# Patient Record
Sex: MALE | Race: WHITE | NOT HISPANIC OR LATINO | Employment: FULL TIME | RURAL
[De-identification: names, ages, dates, MRNs, and addresses within clinical notes are randomized per-mention and may not be internally consistent; named-entity substitution may affect disease eponyms.]

---

## 2022-11-30 ENCOUNTER — OFFICE VISIT (OUTPATIENT)
Dept: FAMILY MEDICINE CLINIC | Facility: CLINIC | Age: 32
End: 2022-11-30

## 2022-11-30 VITALS
HEIGHT: 74 IN | OXYGEN SATURATION: 97 % | WEIGHT: 182 LBS | BODY MASS INDEX: 23.36 KG/M2 | DIASTOLIC BLOOD PRESSURE: 82 MMHG | HEART RATE: 82 BPM | SYSTOLIC BLOOD PRESSURE: 124 MMHG

## 2022-11-30 DIAGNOSIS — S68.620D PARTIAL TRAUMATIC AMPUTATION OF RIGHT INDEX FINGER THROUGH PHALANX, SUBSEQUENT ENCOUNTER: Primary | ICD-10-CM

## 2022-11-30 PROCEDURE — 99203 OFFICE O/P NEW LOW 30 MIN: CPT | Performed by: INTERNAL MEDICINE

## 2022-11-30 RX ORDER — CEPHALEXIN 500 MG/1
CAPSULE ORAL
COMMUNITY
Start: 2022-11-20

## 2022-12-01 NOTE — PROGRESS NOTES
"    Office Note     Name: Juan Long    : 1990     MRN: [id number]     Chief Complaint  Suture / Staple Removal (Pt is here to have sutures removed from right index finger. )    Subjective     History of Present Illness:  Juan Long is a 32 y.o. male who presents today for suture removal.  10 days ago he was seen in the emergency room at  for a finger injury.  He was told he had 12 sutures placed that would need to be removed today.  He also has an artificial nail placed to protect the nailbed.  He is currently on antibiotics as well.  He was advised to follow-up with Ortho but this was not a    Review of Systems:   Review of Systems    Past Medical History:   Past Medical History:   Diagnosis Date   • Heart murmur        Past Surgical History: History reviewed. No pertinent surgical history.    Family History:   Family History   Problem Relation Age of Onset   • Diabetes Maternal Aunt    • Diabetes Maternal Uncle        Social History:   Social History     Socioeconomic History   • Marital status:    Tobacco Use   • Smoking status: Every Day     Packs/day: 0.50     Types: Cigarettes     Start date:    • Smokeless tobacco: Current     Types: Chew   Vaping Use   • Vaping Use: Never used   Substance and Sexual Activity   • Alcohol use: Yes     Comment: rarely   • Drug use: Defer   • Sexual activity: Defer       Immunizations:   Immunization History   Administered Date(s) Administered   • Hep B, Adolescent or Pediatric 2002, 2002, 2002, 10/03/2002   • Tdap 2022        Medications:     Current Outpatient Medications:   •  cephalexin (KEFLEX) 500 MG capsule, , Disp: , Rfl:     Allergies:   Allergies   Allergen Reactions   • Penicillins Unknown - Low Severity   • Ceclor [Cefaclor] Unknown - High Severity       Objective     Vital Signs  /82   Pulse 82   Ht 188 cm (74\")   Wt 82.6 kg (182 lb)   SpO2 97%   BMI 23.37 kg/m²   Estimated body mass index is 23.37 kg/m² " "as calculated from the following:    Height as of this encounter: 188 cm (74\").    Weight as of this encounter: 82.6 kg (182 lb).    BMI is within normal parameters. No other follow-up for BMI required.      Physical Exam  Vitals and nursing note reviewed.   Musculoskeletal:      Comments: Index finger with nail bed cover in place, 9 suture visible, skin well approximated and healing with minimal erythema          Procedures     Assessment and Plan     1. Partial traumatic amputation of right index finger through phalanx, subsequent encounter  Upon review of the patient's records it appears he had a minimal/partial amputation of the distal phalanx of the index finger.  Patient states he was told he had 12 sutures to remove however the records show that he had 4 deep Chromic Gut sutures placed and a superficial erupted sutures. Due to patient having nailbed in place and being unclear how many sutures were to be removed in addition with being unfamiliar  With management of nail bed I made arrangements for patient to have a same day appt with ortho to have the nail evaluated, sutures removed etc. continue cephalexin due to recent open wound and bone injury.  - Ambulatory Referral to Orthopedic Surgery       Follow Up  No follow-ups on file.    MD JOCELYN Howard [facility]  [address] [address] [phone]  "

## 2024-06-11 ENCOUNTER — OFFICE VISIT (OUTPATIENT)
Age: 34
End: 2024-06-11
Payer: COMMERCIAL

## 2024-06-11 VITALS
BODY MASS INDEX: 28.56 KG/M2 | DIASTOLIC BLOOD PRESSURE: 86 MMHG | HEIGHT: 72 IN | WEIGHT: 210.9 LBS | SYSTOLIC BLOOD PRESSURE: 140 MMHG

## 2024-06-11 DIAGNOSIS — M89.8X1 PAIN OF RIGHT CLAVICLE: ICD-10-CM

## 2024-06-11 DIAGNOSIS — S40.811A ABRASION OF RIGHT UPPER ARM, INITIAL ENCOUNTER: ICD-10-CM

## 2024-06-11 DIAGNOSIS — S42.021A CLOSED DISPLACED FRACTURE OF SHAFT OF RIGHT CLAVICLE, INITIAL ENCOUNTER: Primary | ICD-10-CM

## 2024-06-11 PROCEDURE — 99204 OFFICE O/P NEW MOD 45 MIN: CPT | Performed by: ORTHOPAEDIC SURGERY

## 2024-06-11 RX ORDER — ACETAMINOPHEN 500 MG
500 TABLET ORAL EVERY 6 HOURS PRN
COMMUNITY

## 2024-06-11 RX ORDER — NALOXONE HYDROCHLORIDE 4 MG/.1ML
1 SPRAY NASAL
COMMUNITY
Start: 2024-06-06

## 2024-06-11 RX ORDER — BUPRENORPHINE AND NALOXONE 8; 2 MG/1; MG/1
1 FILM, SOLUBLE BUCCAL; SUBLINGUAL DAILY
COMMUNITY
Start: 2024-05-17

## 2024-06-11 RX ORDER — IBUPROFEN 200 MG
200 TABLET ORAL EVERY 6 HOURS PRN
COMMUNITY

## 2024-06-11 RX ORDER — METHOCARBAMOL 500 MG/1
500 TABLET, FILM COATED ORAL
COMMUNITY
Start: 2024-06-06 | End: 2024-06-13
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
## 2024-06-25 ENCOUNTER — OFFICE VISIT (OUTPATIENT)
Age: 34
End: 2024-06-25
Payer: COMMERCIAL

## 2024-06-25 VITALS
WEIGHT: 210 LBS | DIASTOLIC BLOOD PRESSURE: 88 MMHG | OXYGEN SATURATION: 96 % | HEIGHT: 72 IN | BODY MASS INDEX: 28.44 KG/M2 | SYSTOLIC BLOOD PRESSURE: 110 MMHG | HEART RATE: 89 BPM

## 2024-06-25 DIAGNOSIS — S40.811A ABRASION OF RIGHT UPPER ARM, INITIAL ENCOUNTER: ICD-10-CM

## 2024-06-25 DIAGNOSIS — S42.021A CLOSED DISPLACED FRACTURE OF SHAFT OF RIGHT CLAVICLE, INITIAL ENCOUNTER: ICD-10-CM

## 2024-06-25 DIAGNOSIS — M89.8X1 PAIN OF RIGHT CLAVICLE: Primary | ICD-10-CM

## 2024-06-25 DIAGNOSIS — F17.200 SMOKING: ICD-10-CM

## 2024-06-25 PROCEDURE — 99213 OFFICE O/P EST LOW 20 MIN: CPT | Performed by: ORTHOPAEDIC SURGERY
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
## 2024-07-12 ENCOUNTER — TELEPHONE (OUTPATIENT)
Age: 34
End: 2024-07-12
Payer: COMMERCIAL

## 2024-07-12 NOTE — TELEPHONE ENCOUNTER
Need to reschedule his 7/23/24 appointment to the next available. Dr Chaparro will be out of the office.  LM to call back to be jordan.    HUB ok to reschedule.

## 2024-07-15 ENCOUNTER — TELEPHONE (OUTPATIENT)
Age: 34
End: 2024-07-15
Payer: COMMERCIAL

## 2024-07-15 NOTE — TELEPHONE ENCOUNTER
Spoke with patients wife about rescheduling his 7/23/24 appointment. She will have him call back to reschedule.